# Patient Record
Sex: MALE | Race: WHITE | NOT HISPANIC OR LATINO | Employment: FULL TIME | URBAN - METROPOLITAN AREA
[De-identification: names, ages, dates, MRNs, and addresses within clinical notes are randomized per-mention and may not be internally consistent; named-entity substitution may affect disease eponyms.]

---

## 2020-06-29 ENCOUNTER — OFFICE VISIT (OUTPATIENT)
Dept: INTERNAL MEDICINE | Facility: CLINIC | Age: 35
End: 2020-06-29

## 2020-06-29 VITALS
BODY MASS INDEX: 29.77 KG/M2 | WEIGHT: 201 LBS | DIASTOLIC BLOOD PRESSURE: 82 MMHG | HEART RATE: 64 BPM | OXYGEN SATURATION: 98 % | SYSTOLIC BLOOD PRESSURE: 122 MMHG | HEIGHT: 69 IN

## 2020-06-29 DIAGNOSIS — Z00.00 ANNUAL PHYSICAL EXAM: Primary | ICD-10-CM

## 2020-06-29 LAB
ALBUMIN SERPL-MCNC: 5.2 G/DL (ref 3.5–5.2)
ALBUMIN/GLOB SERPL: 2 G/DL
ALP SERPL-CCNC: 67 U/L (ref 39–117)
ALT SERPL-CCNC: 15 U/L (ref 1–41)
AST SERPL-CCNC: 14 U/L (ref 1–40)
BASOPHILS # BLD AUTO: 0.05 10*3/MM3 (ref 0–0.2)
BASOPHILS NFR BLD AUTO: 0.7 % (ref 0–1.5)
BILIRUB SERPL-MCNC: 0.5 MG/DL (ref 0.2–1.2)
BUN SERPL-MCNC: 15 MG/DL (ref 6–20)
BUN/CREAT SERPL: 15.2 (ref 7–25)
CALCIUM SERPL-MCNC: 9.9 MG/DL (ref 8.6–10.5)
CHLORIDE SERPL-SCNC: 101 MMOL/L (ref 98–107)
CHOLEST SERPL-MCNC: 235 MG/DL (ref 0–200)
CO2 SERPL-SCNC: 28.1 MMOL/L (ref 22–29)
CREAT SERPL-MCNC: 0.99 MG/DL (ref 0.76–1.27)
EOSINOPHIL # BLD AUTO: 0.05 10*3/MM3 (ref 0–0.4)
EOSINOPHIL NFR BLD AUTO: 0.7 % (ref 0.3–6.2)
ERYTHROCYTE [DISTWIDTH] IN BLOOD BY AUTOMATED COUNT: 11.8 % (ref 12.3–15.4)
GLOBULIN SER CALC-MCNC: 2.6 GM/DL
GLUCOSE SERPL-MCNC: 107 MG/DL (ref 65–99)
HCT VFR BLD AUTO: 42.8 % (ref 37.5–51)
HDLC SERPL-MCNC: 62 MG/DL (ref 40–60)
HGB BLD-MCNC: 14.9 G/DL (ref 13–17.7)
IMM GRANULOCYTES # BLD AUTO: 0.02 10*3/MM3 (ref 0–0.05)
IMM GRANULOCYTES NFR BLD AUTO: 0.3 % (ref 0–0.5)
LDLC SERPL CALC-MCNC: 129 MG/DL (ref 0–100)
LDLC/HDLC SERPL: 2.09 {RATIO}
LYMPHOCYTES # BLD AUTO: 1.91 10*3/MM3 (ref 0.7–3.1)
LYMPHOCYTES NFR BLD AUTO: 27.5 % (ref 19.6–45.3)
MCH RBC QN AUTO: 31.9 PG (ref 26.6–33)
MCHC RBC AUTO-ENTMCNC: 34.8 G/DL (ref 31.5–35.7)
MCV RBC AUTO: 91.6 FL (ref 79–97)
MONOCYTES # BLD AUTO: 0.51 10*3/MM3 (ref 0.1–0.9)
MONOCYTES NFR BLD AUTO: 7.3 % (ref 5–12)
NEUTROPHILS # BLD AUTO: 4.4 10*3/MM3 (ref 1.7–7)
NEUTROPHILS NFR BLD AUTO: 63.5 % (ref 42.7–76)
NRBC BLD AUTO-RTO: 0 /100 WBC (ref 0–0.2)
PLATELET # BLD AUTO: 271 10*3/MM3 (ref 140–450)
POTASSIUM SERPL-SCNC: 4.9 MMOL/L (ref 3.5–5.2)
PROT SERPL-MCNC: 7.8 G/DL (ref 6–8.5)
RBC # BLD AUTO: 4.67 10*6/MM3 (ref 4.14–5.8)
SODIUM SERPL-SCNC: 139 MMOL/L (ref 136–145)
TRIGL SERPL-MCNC: 218 MG/DL (ref 0–150)
TSH SERPL DL<=0.005 MIU/L-ACNC: 2.48 UIU/ML (ref 0.27–4.2)
VLDLC SERPL CALC-MCNC: 43.6 MG/DL
WBC # BLD AUTO: 6.94 10*3/MM3 (ref 3.4–10.8)

## 2020-06-29 PROCEDURE — 99395 PREV VISIT EST AGE 18-39: CPT | Performed by: INTERNAL MEDICINE

## 2020-06-29 RX ORDER — LEVOCETIRIZINE DIHYDROCHLORIDE 5 MG/1
TABLET, FILM COATED ORAL
COMMUNITY
Start: 2018-06-22

## 2020-06-29 NOTE — PROGRESS NOTES
"Subjective   Sanjiv Damon is a 34 y.o. male.  Patient presents with a chief complaint of needing a general physical exam.  He has no complaints whatsoever and does extremely well stays active exercises on a regular basis does not smoke.  The patient stays very active at work and at home.      /82   Pulse 64   Ht 176.5 cm (69.49\")   Wt 91.2 kg (201 lb)   SpO2 98%   BMI 29.27 kg/m²     Body mass index is 29.27 kg/m².    History of Present Illness no complaints at this time    The following portions of the patient's history were reviewed and updated as appropriate: allergies, current medications, past family history, past medical history, past social history, past surgical history and problem list.    Review of Systems   Constitutional: Negative.    HENT: Negative.    Gastrointestinal: Negative.    Genitourinary: Negative.    Musculoskeletal: Negative.    Neurological: Negative.    Psychiatric/Behavioral: Negative.        Objective   Physical Exam   Constitutional: He is oriented to person, place, and time. He appears well-developed and well-nourished.   HENT:   Head: Normocephalic and atraumatic.   Eyes: Pupils are equal, round, and reactive to light. Conjunctivae are normal.   Neck: Normal range of motion. Neck supple.   Cardiovascular: Normal rate, regular rhythm and normal heart sounds.   Pulmonary/Chest: Effort normal and breath sounds normal.   Abdominal: Soft. Bowel sounds are normal.   Musculoskeletal: Normal range of motion.   Neurological: He is alert and oriented to person, place, and time.   Skin: Skin is warm and dry.   Psychiatric: He has a normal mood and affect. His behavior is normal.   Nursing note and vitals reviewed.        Assessment/Plan   Sanjiv was seen today for annual exam.    Diagnoses and all orders for this visit:    Annual physical exam  Comments:  I am going to do a full set of labs pertinent to the patient's age and gender  Orders:  -     Comprehensive metabolic panel; " Future  -     Lipid Panel With LDL/HDL Ratio; Future  -     CBC w AUTO Differential; Future  -     TSH; Future  -     TSH  -     CBC w AUTO Differential  -     Lipid Panel With LDL/HDL Ratio  -     Comprehensive metabolic panel                 Answers for HPI/ROS submitted by the patient on 6/22/2020   What is the primary reason for your visit?: Physical

## 2020-08-17 ENCOUNTER — TELEPHONE (OUTPATIENT)
Dept: INTERNAL MEDICINE | Facility: CLINIC | Age: 35
End: 2020-08-17

## 2020-08-17 NOTE — TELEPHONE ENCOUNTER
"PT CALLED IN STATING HE HAD A VIRTUAL VISIT WITH ANOTHER PROVIDER  LAST WEEK DUE TO A 5 DAY MIGRAINE. THE DOCTOR ADVISED THE PT TO UP HIS DOSAGE OF IBUPROFEN BUT TO CALL BACK IF THAT DIDN'T HELP. PATIENT ADVISED THAT HE STILL HAS A MIGRAINE AND THAT THE DOCTOR KELI TOLD HIM TO CALL HIS PCP TO \"GET TESTED\" UNSURE ABOUT THE TEST THE PT IS REFERRING TO. PLEASE ADVISE.   "

## 2020-08-17 NOTE — TELEPHONE ENCOUNTER
Patient is negative for all covid questions. He states the migraine has been occurring since last Thursday only relieved for short period of time by ibuprofen 800mg. Office visit scheduled for patient after discussing with isaiah archer

## 2020-08-17 NOTE — TELEPHONE ENCOUNTER
Patient had a tele-a-doc visit over weekend. Stating that he was told that since he doesn't normally have these headaches to see his PCP for possible meningitis. Please advise

## 2020-08-17 NOTE — TELEPHONE ENCOUNTER
Please discuss with patient-we either need to work him in the office to be seen or is he is acutely ill, he should report to the ER. Thanks.

## 2020-08-18 ENCOUNTER — HOSPITAL ENCOUNTER (OUTPATIENT)
Dept: CT IMAGING | Facility: HOSPITAL | Age: 35
Discharge: HOME | End: 2020-08-18
Admitting: NURSE PRACTITIONER

## 2020-08-18 ENCOUNTER — OFFICE VISIT (OUTPATIENT)
Dept: INTERNAL MEDICINE | Facility: CLINIC | Age: 35
End: 2020-08-18

## 2020-08-18 VITALS
OXYGEN SATURATION: 98 % | WEIGHT: 209 LBS | HEIGHT: 64 IN | BODY MASS INDEX: 35.68 KG/M2 | TEMPERATURE: 98.8 F | SYSTOLIC BLOOD PRESSURE: 126 MMHG | DIASTOLIC BLOOD PRESSURE: 88 MMHG | HEART RATE: 78 BPM

## 2020-08-18 DIAGNOSIS — R51.9 ACUTE INTRACTABLE HEADACHE, UNSPECIFIED HEADACHE TYPE: Primary | ICD-10-CM

## 2020-08-18 DIAGNOSIS — R51.9 ACUTE INTRACTABLE HEADACHE, UNSPECIFIED HEADACHE TYPE: ICD-10-CM

## 2020-08-18 LAB — ERYTHROCYTE [SEDIMENTATION RATE] IN BLOOD: 2 MM/HR (ref 0–20)

## 2020-08-18 PROCEDURE — 70450 CT HEAD/BRAIN W/O DYE: CPT

## 2020-08-18 PROCEDURE — 85651 RBC SED RATE NONAUTOMATED: CPT | Performed by: NURSE PRACTITIONER

## 2020-08-18 PROCEDURE — 36415 COLL VENOUS BLD VENIPUNCTURE: CPT | Performed by: NURSE PRACTITIONER

## 2020-08-18 PROCEDURE — 99214 OFFICE O/P EST MOD 30 MIN: CPT | Performed by: NURSE PRACTITIONER

## 2020-08-18 NOTE — NURSING NOTE
Imani OREILLY  Called RN in xray triage to speak to pt and Imani stated to RN that pt may go home. Results of CT Head were called to Imani OREILLY by Dr. Dominguez.

## 2020-08-18 NOTE — PROGRESS NOTES
Subjective   Sanjiv Damon is a 34 y.o. male who presents due to a right-sided headache since Thursday.    He went for a bike ride on Thursday and did note some tightness at the base of his neck.  However, he complains of a persistent headache on the right side of his head which he describes as throbbing in nature and with pressure since then.  He does note photosensitivity and phonosensitivity.  He started taking ibuprofen 600 mg 3 times daily after a telemedicine visit on Sunday.  He does report mild improvement in the symptoms but continues to experience a headache.  He denies previous history of migraine headaches.  No recent head injury or trauma.  He denies visual changes.  No dizziness.    Headache    Associated symptoms include photophobia. Pertinent negatives include no abdominal pain, coughing, dizziness, ear pain, fever, nausea, neck pain, sinus pressure, sore throat, vomiting or weakness.        The following portions of the patient's history were reviewed and updated as appropriate: allergies, current medications, past social history and problem list.    History reviewed. No pertinent past medical history.      Current Outpatient Medications:   •  levocetirizine (Xyzal Allergy 24HR) 5 MG tablet, , Disp: , Rfl:     No Known Allergies    Review of Systems   Constitutional: Negative for chills, fatigue, fever and unexpected weight change.   HENT: Negative for congestion, ear pain, postnasal drip, sinus pressure, sore throat and trouble swallowing.    Eyes: Positive for photophobia. Negative for visual disturbance.   Respiratory: Negative for cough, chest tightness and wheezing.    Cardiovascular: Negative for chest pain, palpitations and leg swelling.   Gastrointestinal: Negative for abdominal pain, blood in stool, nausea and vomiting.   Genitourinary: Negative for dysuria, frequency and urgency.   Musculoskeletal: Negative for arthralgias, joint swelling, neck pain and neck stiffness.   Skin: Negative  "for color change.   Neurological: Positive for headaches. Negative for dizziness, syncope, weakness and light-headedness.   Hematological: Does not bruise/bleed easily.       Objective   Vitals:    08/18/20 1309   BP: 126/88   BP Location: Left arm   Patient Position: Sitting   Cuff Size: Adult   Pulse: 78   Temp: 98.8 °F (37.1 °C)   TempSrc: Oral   SpO2: 98%   Weight: 94.8 kg (209 lb)   Height: 163.8 cm (64.49\")     Body mass index is 35.33 kg/m².  Physical Exam   Constitutional: He appears well-developed and well-nourished. He is cooperative. He does not have a sickly appearance. He does not appear ill.   HENT:   Head: Normocephalic.   Right Ear: Hearing, tympanic membrane and external ear normal.   Left Ear: Hearing, tympanic membrane and external ear normal.   Nose: Nose normal. No mucosal edema, rhinorrhea, sinus tenderness or nasal deformity. Right sinus exhibits no maxillary sinus tenderness and no frontal sinus tenderness. Left sinus exhibits no maxillary sinus tenderness and no frontal sinus tenderness.   Mouth/Throat: Oropharynx is clear and moist and mucous membranes are normal. Normal dentition.   Eyes: Conjunctivae and lids are normal. Right eye exhibits no discharge and no exudate. Left eye exhibits no discharge and no exudate.   Neck: Trachea normal. Muscular tenderness present. No spinous process tenderness present. No edema and normal range of motion present. No thyroid mass present.   Cardiovascular: Regular rhythm, normal heart sounds and normal pulses.   No murmur heard.  Pulmonary/Chest: Breath sounds normal. No respiratory distress. He has no decreased breath sounds. He has no wheezes. He has no rhonchi. He has no rales.   Lymphadenopathy:        Head (right side): No submental, no submandibular, no tonsillar, no preauricular, no posterior auricular and no occipital adenopathy present.        Head (left side): No submental, no submandibular, no tonsillar, no preauricular, no posterior auricular " and no occipital adenopathy present.   Neurological: He is alert. He has normal strength. No sensory deficit.   Skin: Skin is warm, dry and intact. No cyanosis. Nails show no clubbing.       Assessment/Plan   Sanjiv was seen today for headache.    Diagnoses and all orders for this visit:    Acute intractable headache, unspecified headache type  -     CT Head Without Contrast; Future  -     Sedimentation Rate  -     Comprehensive Metabolic Panel  -     CBC & Differential    Discussed sx with patient, he does report improvement since starting ibuprofen consistently since Sunday.  However, due to no previous history of headaches and severity of pain I will go ahead and order a CT scan to further evaluate.  He will start a Medrol Dosepak tomorrow due to length of symptoms and we will reevaluate on Friday.  I have also ordered labs.

## 2020-08-19 ENCOUNTER — TELEPHONE (OUTPATIENT)
Dept: INTERNAL MEDICINE | Facility: CLINIC | Age: 35
End: 2020-08-19

## 2020-08-19 DIAGNOSIS — R51.9 ACUTE INTRACTABLE HEADACHE, UNSPECIFIED HEADACHE TYPE: Primary | ICD-10-CM

## 2020-08-19 LAB
ALBUMIN SERPL-MCNC: 5.4 G/DL (ref 4–5)
ALBUMIN/GLOB SERPL: 2.2 {RATIO} (ref 1.2–2.2)
ALP SERPL-CCNC: 71 IU/L (ref 39–117)
ALT SERPL-CCNC: 21 IU/L (ref 0–44)
AST SERPL-CCNC: 21 IU/L (ref 0–40)
BASOPHILS # BLD AUTO: 0.1 X10E3/UL (ref 0–0.2)
BASOPHILS NFR BLD AUTO: 1 %
BILIRUB SERPL-MCNC: 0.3 MG/DL (ref 0–1.2)
BUN SERPL-MCNC: 17 MG/DL (ref 6–20)
BUN/CREAT SERPL: 19 (ref 9–20)
CALCIUM SERPL-MCNC: 10.7 MG/DL (ref 8.7–10.2)
CHLORIDE SERPL-SCNC: 100 MMOL/L (ref 96–106)
CO2 SERPL-SCNC: 27 MMOL/L (ref 20–29)
CREAT SERPL-MCNC: 0.91 MG/DL (ref 0.76–1.27)
EOSINOPHIL # BLD AUTO: 0.1 X10E3/UL (ref 0–0.4)
EOSINOPHIL NFR BLD AUTO: 1 %
ERYTHROCYTE [DISTWIDTH] IN BLOOD BY AUTOMATED COUNT: 11.8 % (ref 11.6–15.4)
GLOBULIN SER CALC-MCNC: 2.5 G/DL (ref 1.5–4.5)
GLUCOSE SERPL-MCNC: 94 MG/DL (ref 65–99)
HCT VFR BLD AUTO: 46.6 % (ref 37.5–51)
HGB BLD-MCNC: 16 G/DL (ref 13–17.7)
IMM GRANULOCYTES # BLD AUTO: 0 X10E3/UL (ref 0–0.1)
IMM GRANULOCYTES NFR BLD AUTO: 0 %
LYMPHOCYTES # BLD AUTO: 2.2 X10E3/UL (ref 0.7–3.1)
LYMPHOCYTES NFR BLD AUTO: 34 %
MCH RBC QN AUTO: 31.2 PG (ref 26.6–33)
MCHC RBC AUTO-ENTMCNC: 34.3 G/DL (ref 31.5–35.7)
MCV RBC AUTO: 91 FL (ref 79–97)
MONOCYTES # BLD AUTO: 0.5 X10E3/UL (ref 0.1–0.9)
MONOCYTES NFR BLD AUTO: 8 %
NEUTROPHILS # BLD AUTO: 3.6 X10E3/UL (ref 1.4–7)
NEUTROPHILS NFR BLD AUTO: 56 %
PLATELET # BLD AUTO: 272 X10E3/UL (ref 150–450)
POTASSIUM SERPL-SCNC: 5.1 MMOL/L (ref 3.5–5.2)
PROT SERPL-MCNC: 7.9 G/DL (ref 6–8.5)
RBC # BLD AUTO: 5.13 X10E6/UL (ref 4.14–5.8)
SODIUM SERPL-SCNC: 145 MMOL/L (ref 134–144)
WBC # BLD AUTO: 6.4 X10E3/UL (ref 3.4–10.8)

## 2020-08-19 RX ORDER — METHYLPREDNISOLONE 4 MG/1
TABLET ORAL
Qty: 21 TABLET | Refills: 0 | Status: SHIPPED | OUTPATIENT
Start: 2020-08-19 | End: 2020-08-25

## 2020-08-19 NOTE — TELEPHONE ENCOUNTER
PT CALLED STATING HE SAW ERIC PARKINSON MITZI AND SHE WAS SUPPOSE TO CALL IN A MEDICATION BUT THE PHARMACY DOES NOT HAVE IT.     PLEASE ADVISE     PREFERRED PHARMACY   [id number] - [place] [address] AT [address] - [phone]  - [phone] FX  [phone]    PT CALL BACK   [phone]

## 2020-08-21 ENCOUNTER — OFFICE VISIT (OUTPATIENT)
Dept: INTERNAL MEDICINE | Facility: CLINIC | Age: 35
End: 2020-08-21

## 2020-08-21 VITALS
TEMPERATURE: 97.8 F | OXYGEN SATURATION: 98 % | HEART RATE: 62 BPM | DIASTOLIC BLOOD PRESSURE: 84 MMHG | WEIGHT: 205 LBS | SYSTOLIC BLOOD PRESSURE: 130 MMHG | BODY MASS INDEX: 35 KG/M2 | HEIGHT: 64 IN

## 2020-08-21 DIAGNOSIS — R51.9 ACUTE INTRACTABLE HEADACHE, UNSPECIFIED HEADACHE TYPE: Primary | ICD-10-CM

## 2020-08-21 PROCEDURE — 99213 OFFICE O/P EST LOW 20 MIN: CPT | Performed by: NURSE PRACTITIONER

## 2020-08-22 NOTE — PROGRESS NOTES
Subjective   Sanjiv Damon is a 34 y.o. male who presents for a fu regarding a persistent headache.    He presents for follow-up regarding a persistent headache.  His CT scan of the head and labs earlier this week did not show any acute abnormalities.  He states the throbbing sensation in his head has resolved.  He now has mild pressure on the left occipital area but the discomfort on the right side of his head has resolved.  He denies visual changes.  No dizziness.  Denies neck pain or stiffness.    Headache    Pertinent negatives include no abdominal pain, coughing, ear pain, fever, nausea, neck pain, sinus pressure, sore throat, vomiting or weakness.        The following portions of the patient's history were reviewed and updated as appropriate: allergies, current medications, past social history and problem list.    History reviewed. No pertinent past medical history.      Current Outpatient Medications:   •  levocetirizine (Xyzal Allergy 24HR) 5 MG tablet, , Disp: , Rfl:   •  methylPREDNISolone (Medrol) 4 MG dose pack, follow package directions, Disp: 21 tablet, Rfl: 0    No Known Allergies    Review of Systems   Constitutional: Negative for chills, fatigue, fever and unexpected weight change.   HENT: Negative for congestion, ear pain, postnasal drip, sinus pressure, sore throat and trouble swallowing.    Eyes: Negative for visual disturbance.   Respiratory: Negative for cough, chest tightness and wheezing.    Cardiovascular: Negative for chest pain, palpitations and leg swelling.   Gastrointestinal: Negative for abdominal pain, blood in stool, nausea and vomiting.   Genitourinary: Negative for dysuria, frequency and urgency.   Musculoskeletal: Negative for arthralgias, joint swelling, neck pain and neck stiffness.   Skin: Negative for color change.   Neurological: Positive for headaches. Negative for syncope and weakness.   Hematological: Does not bruise/bleed easily.       Objective   Vitals:    08/21/20  "0902   BP: 130/84   BP Location: Left arm   Patient Position: Sitting   Cuff Size: Adult   Pulse: 62   Temp: 97.8 °F (36.6 °C)   TempSrc: Oral   SpO2: 98%   Weight: 93 kg (205 lb)   Height: 163.8 cm (64.49\")     Body mass index is 34.66 kg/m².  Physical Exam   Constitutional: He appears well-developed and well-nourished. He is cooperative. He does not have a sickly appearance. He does not appear ill.   HENT:   Head: Normocephalic.   Right Ear: Hearing, tympanic membrane and external ear normal.   Left Ear: Hearing, tympanic membrane and external ear normal.   Nose: Nose normal. No mucosal edema, rhinorrhea, sinus tenderness or nasal deformity. Right sinus exhibits no maxillary sinus tenderness and no frontal sinus tenderness. Left sinus exhibits no maxillary sinus tenderness and no frontal sinus tenderness.   Mouth/Throat: Oropharynx is clear and moist and mucous membranes are normal. Normal dentition.   Eyes: Conjunctivae and lids are normal. Right eye exhibits no discharge and no exudate. Left eye exhibits no discharge and no exudate.   Neck: Trachea normal. No muscular tenderness present. Carotid bruit is not present. No edema and normal range of motion present. No thyroid mass present.   Cardiovascular: Regular rhythm, normal heart sounds and normal pulses.   No murmur heard.  Pulmonary/Chest: Breath sounds normal. No respiratory distress. He has no decreased breath sounds. He has no wheezes. He has no rhonchi. He has no rales.   Lymphadenopathy:        Head (right side): No submental, no submandibular, no tonsillar, no preauricular, no posterior auricular and no occipital adenopathy present.        Head (left side): No submental, no submandibular, no tonsillar, no preauricular, no posterior auricular and no occipital adenopathy present.   Neurological: He is alert. He has normal strength. No sensory deficit.   Skin: Skin is warm, dry and intact. No cyanosis. Nails show no clubbing.       Assessment/Plan "   Sanjiv was seen today for headache.    Diagnoses and all orders for this visit:    Acute intractable headache, unspecified headache type    Reviewed recent labs and CT scan with patient which did not show any acute abnormalities.  He is responding well to Medrol Dosepak and will complete therapy.  He will email the office next week with an update of the symptoms.  Symptoms due to new onset of migraine headaches?  He may need Imitrex if future headaches occur, continue to monitor.

## 2020-08-26 ENCOUNTER — OFFICE VISIT (OUTPATIENT)
Dept: INTERNAL MEDICINE | Facility: CLINIC | Age: 35
End: 2020-08-26

## 2020-08-26 DIAGNOSIS — R51.9 ACUTE INTRACTABLE HEADACHE, UNSPECIFIED HEADACHE TYPE: Primary | ICD-10-CM

## 2020-08-26 PROCEDURE — 99442 PR PHYS/QHP TELEPHONE EVALUATION 11-20 MIN: CPT | Performed by: NURSE PRACTITIONER

## 2020-08-26 RX ORDER — SUMATRIPTAN 100 MG/1
TABLET, FILM COATED ORAL
Qty: 12 TABLET | Refills: 0 | Status: SHIPPED | OUTPATIENT
Start: 2020-08-26

## 2020-08-26 NOTE — PROGRESS NOTES
Subjective   Sanjiv Damon is a 34 y.o. male who presents for f/u regarding headache.    You have chosen to receive care through a telephone visit. Do you consent to use a telephone visit for your medical care today? Yes    He has completed a Medrol dose pack for a persistent headache.  He states the headache was improving but has gradually worsened since stopping the medication.  The pain is more in the right occipital area and he denies recent head injury or trauma.  No vision changes or dizziness.  He reports a pressure behind his right eye.  He has had a couple instances of nausea but denies vomiting or diarrhea.  He has been taking ibuprofen 400 mg with little improvement.  He did have a CT scan of the head last weeks which did not show any acute abnormalities.  Labs were also normal.       The following portions of the patient's history were reviewed and updated as appropriate: allergies, current medications, past social history and problem list.    History reviewed. No pertinent past medical history.      Current Outpatient Medications:   •  levocetirizine (Xyzal Allergy 24HR) 5 MG tablet, , Disp: , Rfl:   •  SUMAtriptan (IMITREX) 100 MG tablet, Take one tablet at onset of headache. May repeat dose one time in 2 hours if headache not relieved., Disp: 12 tablet, Rfl: 0    No Known Allergies    Review of Systems   Constitutional: Negative for chills, fatigue, fever and unexpected weight change.   HENT: Negative for congestion, ear pain, postnasal drip, sinus pressure, sore throat and trouble swallowing.    Eyes: Negative for visual disturbance.   Respiratory: Negative for cough, chest tightness and wheezing.    Cardiovascular: Negative for chest pain, palpitations and leg swelling.   Gastrointestinal: Negative for abdominal pain, blood in stool, nausea and vomiting.   Genitourinary: Negative for dysuria, frequency and urgency.   Musculoskeletal: Negative for arthralgias and joint swelling.   Skin: Negative  for color change.   Neurological: Positive for headaches. Negative for syncope and weakness.   Hematological: Does not bruise/bleed easily.       Objective   There were no vitals filed for this visit.  There is no height or weight on file to calculate BMI.  Physical Exam   Psychiatric: He has a normal mood and affect. His behavior is normal. Judgment and thought content normal.       Assessment/Plan   Sanjiv was seen today for headache.    Diagnoses and all orders for this visit:    Acute intractable headache, unspecified headache type  -     SUMAtriptan (IMITREX) 100 MG tablet; Take one tablet at onset of headache. May repeat dose one time in 2 hours if headache not relieved.  -     MRI Brain With & Without Contrast; Future    He continues to complain of a persistent headache without a previous history of headaches so.  His CT scan and labs did not show any abnormalities.  Due to persistence of symptoms I would like to obtain an MRI to further evaluate.  Symptoms due to new onset of migraine headache?  He has given Imitrex which he may begin for headache but will follow results of MRI as well.    History and medical judgement obtained from phone conversation with patient. No physical examination was performed. Approximately 11 minutes spent in phone conversation with patient.

## 2020-08-31 ENCOUNTER — TELEPHONE (OUTPATIENT)
Dept: INTERNAL MEDICINE | Facility: CLINIC | Age: 35
End: 2020-08-31

## 2020-08-31 NOTE — TELEPHONE ENCOUNTER
appt made for 9/3/2020 at 10:20AM-  No auth needed.  Order faxed to [facility] and pt notified of appt date and time

## 2020-08-31 NOTE — TELEPHONE ENCOUNTER
PATIENT CALLED AND WANTED TO KNOW THE STATUS OF MRI OF BRAIN .   PLEASE CALL WHEN APPROVED AND ABLE TO SET UP APPOINTMENT. STILL CONTINUES TO HAVE HEADACHES.    PLEASE CALL [phone]    HE WOULD LIKE TO GO TO [facility]  OR [facility] [address].

## 2020-09-09 ENCOUNTER — TELEPHONE (OUTPATIENT)
Dept: INTERNAL MEDICINE | Facility: CLINIC | Age: 35
End: 2020-09-09

## 2020-09-09 NOTE — TELEPHONE ENCOUNTER
Moonlighter at bedside, aware of new BP.    pro    Caller: DELMER LARSEN    Relationship:     Best call back number: [phone]         What test was performed: MRI    When was the test performed: 09/03/2020    Where was the test performed: [facility]    Additional notes: PT CALLED REQUESTING WHEN HE MAY RECEIVE HIS IMAGING RESULTS

## 2020-09-09 NOTE — TELEPHONE ENCOUNTER
Discussed results of MRI with patient-his headache is better, did take Ibuprofen over the weekend but states sx improved.

## 2021-01-11 ENCOUNTER — OFFICE VISIT (OUTPATIENT)
Dept: FAMILY MEDICINE CLINIC | Facility: CLINIC | Age: 36
End: 2021-01-11

## 2021-01-11 VITALS
WEIGHT: 214 LBS | OXYGEN SATURATION: 99 % | BODY MASS INDEX: 31.7 KG/M2 | DIASTOLIC BLOOD PRESSURE: 76 MMHG | HEIGHT: 69 IN | SYSTOLIC BLOOD PRESSURE: 120 MMHG | RESPIRATION RATE: 16 BRPM | TEMPERATURE: 96.9 F | HEART RATE: 93 BPM

## 2021-01-11 DIAGNOSIS — R19.7 DIARRHEA, UNSPECIFIED TYPE: Primary | ICD-10-CM

## 2021-01-11 PROCEDURE — 99213 OFFICE O/P EST LOW 20 MIN: CPT | Performed by: FAMILY MEDICINE

## 2021-01-11 NOTE — PROGRESS NOTES
"Chief Complaint  Annual Exam    Subjective    History of Present Illness {CC  Problem List  Visit  Diagnosis   Encounters  Notes  Medications  Labs  Result Review Imaging  Media :23}     Sanjiv Damon presents to [facility] for Annual Exam.  History of Present Illness   Here to establish care. Was previously seen by Dr. Wagner once, now needing a new PCP.     Overall healthy but has noticed some bowel issues for the past few years. Feels like he needs to use the bathroom more than is \"usual\". Initially started taking some Lactaid with dairy, which seems to help, but symptoms remain. On a typical day, he'll have his first bowel movement shortly after drinking a cup of coffee, then another midmorning, then another after lunch. Will occasionally have another in the afternoon/evening but that seems more diet dependent. Has a fair amount of gas associated, can get uncomfortable if he can't pass it readily. No pain with defecation though occasionally has some bleeding if he's going more than usual. Doesn't use baby wipes regularly.     Objective     Vital Signs:   /76   Pulse 93   Temp 96.9 °F (36.1 °C)   Resp 16   Ht 175.3 cm (69\")   Wt 97.1 kg (214 lb)   SpO2 99%   BMI 31.60 kg/m²   Physical Exam  Vitals signs and nursing note reviewed.   Constitutional:       General: He is not in acute distress.     Appearance: Normal appearance. He is well-developed. He is not ill-appearing.   HENT:      Right Ear: Tympanic membrane, ear canal and external ear normal.      Left Ear: Tympanic membrane, ear canal and external ear normal.      Nose: Nose normal.      Mouth/Throat:      Mouth: Mucous membranes are moist.      Pharynx: Oropharynx is clear.   Eyes:      Extraocular Movements: Extraocular movements intact.      Conjunctiva/sclera: Conjunctivae normal.      Pupils: Pupils are equal, round, and reactive to light.   Neck:      Musculoskeletal: Normal range of " motion and neck supple.   Cardiovascular:      Rate and Rhythm: Normal rate and regular rhythm.      Pulses: Normal pulses.      Heart sounds: Normal heart sounds. No murmur.   Pulmonary:      Effort: Pulmonary effort is normal. No respiratory distress.      Breath sounds: Normal breath sounds. No wheezing.   Abdominal:      General: Bowel sounds are normal. There is no distension.      Palpations: Abdomen is soft.      Tenderness: There is no abdominal tenderness. There is no guarding or rebound.   Musculoskeletal: Normal range of motion.         General: No tenderness.   Skin:     General: Skin is warm and dry.   Neurological:      General: No focal deficit present.      Mental Status: He is alert and oriented to person, place, and time.      Sensory: No sensory deficit.   Psychiatric:         Mood and Affect: Mood normal.         Behavior: Behavior normal.          Result Review  Data Reviewed:{ Labs  Result Review  Imaging  Med Tab  Media :23}                   Assessment and Plan {CC Problem List  Visit Diagnosis  ROS  Review (Popup)  Health Maintenance  Quality  BestPractice  Medications  SmartSets  SnapShot Encounters  Media :23}   Diagnoses and all orders for this visit:    1. Diarrhea, unspecified type (Primary)    Etiology of diarrhea not certain though certainly seems somewhat functional. May have some component of irritable bowel, and certainly some ongoing lactose intolerance. Recommended trying a daily dose of Metamucil for general bowel regulation. Also suggested he try some probiotic foods. He will do all of this and let me know if he makes any progress.    Follow-up as below. Encouraged communication via [employer] in the meantime.      Follow Up {Instructions Charge Capture  Follow-up Communications :23}     Patient was given instructions and counseling regarding his condition or for health maintenance advice. Please see specific information pulled into the AVS (placed there by  myself) if appropriate.    Return in about 3 months (around 4/11/2021), or if symptoms worsen or fail to improve.      SEAN Lee MD

## 2021-04-16 ENCOUNTER — BULK ORDERING (OUTPATIENT)
Dept: CASE MANAGEMENT | Facility: OTHER | Age: 36
End: 2021-04-16

## 2021-04-16 DIAGNOSIS — Z23 IMMUNIZATION DUE: ICD-10-CM

## 2021-09-28 ENCOUNTER — OFFICE VISIT (OUTPATIENT)
Dept: FAMILY MEDICINE CLINIC | Facility: CLINIC | Age: 36
End: 2021-09-28

## 2021-09-28 VITALS
DIASTOLIC BLOOD PRESSURE: 68 MMHG | WEIGHT: 205 LBS | HEART RATE: 81 BPM | BODY MASS INDEX: 30.27 KG/M2 | RESPIRATION RATE: 16 BRPM | OXYGEN SATURATION: 99 % | SYSTOLIC BLOOD PRESSURE: 100 MMHG

## 2021-09-28 DIAGNOSIS — Z23 NEED FOR VACCINATION: ICD-10-CM

## 2021-09-28 DIAGNOSIS — Z00.00 ROUTINE HEALTH MAINTENANCE: Primary | ICD-10-CM

## 2021-09-28 PROCEDURE — 90686 IIV4 VACC NO PRSV 0.5 ML IM: CPT | Performed by: FAMILY MEDICINE

## 2021-09-28 PROCEDURE — 90471 IMMUNIZATION ADMIN: CPT | Performed by: FAMILY MEDICINE

## 2021-09-28 PROCEDURE — 99395 PREV VISIT EST AGE 18-39: CPT | Performed by: FAMILY MEDICINE

## 2021-09-28 NOTE — PROGRESS NOTES
Chief Complaint  Annual Exam    Subjective    History of Present Illness {CC  Problem List  Visit  Diagnosis   Encounters  Notes  Medications  Labs  Result Review Imaging  Media :23}     Sanjiv Damon presents to [facility] for Annual Exam.  History of Present Illness     Here today for annual exam and general preventive maintenance. Doing quite well overall. Baby daughter was born a couple weeks ago, is currently on paternity leave and enjoying every minute.    Trying to take good care of himself now that he is a new father. Working on his diet, tries to exercise regularly. Takes his levo cetirizine as needed for allergies, has not needed any further sumatriptan.    Is up-to-date on most preventive recommendations. Would like to get a flu shot while he is here today.    Has good family and social support. Enjoys his work. Goes to the dentist regularly.    Objective     Vital Signs:   /68   Pulse 81   Resp 16   Wt 93 kg (205 lb)   SpO2 99%   BMI 30.27 kg/m²   Physical Exam  Vitals and nursing note reviewed.   Constitutional:       General: He is not in acute distress.     Appearance: Normal appearance. He is not ill-appearing.   Cardiovascular:      Rate and Rhythm: Normal rate and regular rhythm.      Pulses: Normal pulses.      Heart sounds: Normal heart sounds. No murmur heard.     Pulmonary:      Effort: Pulmonary effort is normal. No respiratory distress.      Breath sounds: Normal breath sounds. No rales.   Neurological:      Mental Status: He is alert and oriented to person, place, and time. Mental status is at baseline.   Psychiatric:         Mood and Affect: Mood normal.         Behavior: Behavior normal.          Result Review  Data Reviewed:{ Labs  Result Review  Imaging  Med Tab  Media :23}                   Assessment and Plan {CC Problem List  Visit Diagnosis  ROS  Review (Popup)  Health Maintenance  Quality  BestPractice  Medications   SmartSets  SnapShot Encounters  Media :23}   Diagnoses and all orders for this visit:    1. Routine health maintenance (Primary)    2. Need for vaccination  -     FluLaval/Fluarix >6 Months ([id number])    Flu shot is requested. No other interventions recommended at this time.    Recommended follow-up as below. Encouraged communication via [employer] in the meantime.      Follow Up {Instructions Charge Capture  Follow-up Communications :23}     Patient was given instructions and counseling regarding his condition or for health maintenance advice. Please see specific information pulled into the AVS (placed there by myself) if appropriate.    Return in about 1 year (around 9/28/2022) for Preventive Health Maintenance.      SEAN Lee MD    Prevention counseling performed as below: Mindfulness for stress management.

## 2022-12-21 ENCOUNTER — TELEPHONE (OUTPATIENT)
Dept: FAMILY MEDICINE CLINIC | Facility: CLINIC | Age: 37
End: 2022-12-21

## 2022-12-21 ENCOUNTER — TELEMEDICINE (OUTPATIENT)
Dept: FAMILY MEDICINE CLINIC | Facility: TELEHEALTH | Age: 37
End: 2022-12-21

## 2022-12-21 VITALS — HEIGHT: 69 IN | WEIGHT: 200 LBS | BODY MASS INDEX: 29.62 KG/M2

## 2022-12-21 DIAGNOSIS — U07.1 COVID-19: Primary | ICD-10-CM

## 2022-12-21 PROCEDURE — 99213 OFFICE O/P EST LOW 20 MIN: CPT | Performed by: NURSE PRACTITIONER

## 2022-12-21 RX ORDER — BROMPHENIRAMINE MALEATE, PSEUDOEPHEDRINE HYDROCHLORIDE, AND DEXTROMETHORPHAN HYDROBROMIDE 2; 30; 10 MG/5ML; MG/5ML; MG/5ML
10 SYRUP ORAL 4 TIMES DAILY PRN
Qty: 280 ML | Refills: 0 | Status: SHIPPED | OUTPATIENT
Start: 2022-12-21

## 2022-12-21 RX ORDER — BENZONATATE 200 MG/1
200 CAPSULE ORAL 3 TIMES DAILY PRN
Qty: 15 CAPSULE | Refills: 0 | Status: SHIPPED | OUTPATIENT
Start: 2022-12-21

## 2022-12-21 RX ORDER — ALBUTEROL SULFATE 90 UG/1
2 AEROSOL, METERED RESPIRATORY (INHALATION) EVERY 4 HOURS PRN
Qty: 6.7 G | Refills: 0 | Status: SHIPPED | OUTPATIENT
Start: 2022-12-21

## 2022-12-21 NOTE — TELEPHONE ENCOUNTER
HUB TO READ:    Left VM. Pt does not qualify for Paxlovid d/t age and no underlying comorbidities. Dr. Lee recommend OTC symptom mgmt- can alternate between tylenol/ibuprofen, delysm or mucinex for cough/congestion, lots of fluid and rest.

## 2022-12-21 NOTE — PATIENT INSTRUCTIONS
Treat symptoms as you will with any cold or viral illness.  May use over-the-counter medicine as needed and rotate with prescribed cough syrup.  Tylenol/ibuprofen for pain and fever, increase fluids and rest.    Warning signs for COVID-19: trouble breathing, increasing shortness of breath, persistent chest pain or pressure, new confusion, inability to stay awake, pale, gray or blue-colored skin, lips or nail beds. If you show any of these signs seek Emergency Care.     If symptoms worsen or do not improve follow up with your PCP or visit your nearest Urgent Care Center or ER.      COVID QUARANTINE GUIDELINES:    You should Quarantine while you are waiting for your results.     Positive COVID result:  Isolate for 10 days from the date your symptoms began.  If symptoms fully resolve, you may end Isolation after 5 days from the onset of symptoms and wear a well-fitted mask for the additional 5 days.   If you can not wear a well-fitted mask AT ALL TIMES, you must remain in isolation for a full 10 days from the onset of symptoms.     If you have a Positive COVID result and NEVER had symptoms:  Isolate for 5 days from the date you had a positive test.   Wear a well-fitted mask for an additional 5 days.   If you can not wear a well fitted mask AT ALL TIMES, you must remain in isolation for the full 10 days from the onset of symptoms.       If you are NOT fully Vaccinated or had a Booster shot if eligible, but have had COVID EXPOSURE:  Quarantine for 10 days from the last day of exposure  Quarantine may be shortened if you have no symptoms and test Negative on day 5 post exposure.   Wear a well-fitted mask for 10 days from the last day of exposure.  If symptoms develop, stay home and get re-tested.     If HAVE been fully Vaccinated and had a Booster shot if eligible, but have had COVID EXPOSURE and have No Symptoms:  You do NOT need to quarantine  Wear a well-fitted mask for 10 days from the last day of exposure.  Get a  COVID test on day 5.  If symptoms develop, stay home and get re-tested.

## 2022-12-21 NOTE — TELEPHONE ENCOUNTER
Caller: Sanjiv Damon    Relationship to patient: Self    Best call back number: [phone]     Date of exposure: 12/17/22    Date of positive COVID19 test: 12/20/22    COVID19 symptoms: COUGH / CONGESTION / BODY ACHES / FATIGUE    Date of initial quarantine: 12/20/22    Additional information or concerns: PATIENT WOULD LIKE TO KNOW IF HE SHOULD HAVE PAXLOVID, OR WHAT OTHER TREATMENT OPTIONS HE HAS AVAILABLE.    What is the patients preferred pharmacy: [id number] [address] AT [facility] [address] - [phone] [facility] [phone]   [phone]

## 2022-12-21 NOTE — PROGRESS NOTES
Subjective   Chief Complaint   Patient presents with   • URI     COVID 19       Sanjiv Damon is a 37 y.o. male.     History of Present Illness  Patient reports cough, congestion, runny nose, body aches and fatigue for about 3 days.  He also feels like he cannot take a deep breath or feels winded.  Symptoms are worsening.  He tested positive for COVID yesterday.  He has been vaccinated plus boosted a couple of months ago.  No prior history of COVID.  He is interested in Paxlovid treatment.  URI   This is a new problem. Episode onset: 3 days. The problem has been gradually worsening. Associated symptoms include congestion, coughing, rhinorrhea and a sore throat. Pertinent negatives include no chest pain, nausea, vomiting or wheezing. He has tried nothing for the symptoms.        No Known Allergies    Past Medical History:   Diagnosis Date   • Hypertension        Past Surgical History:   Procedure Laterality Date   • FINGER FRACTURE SURGERY Right 2012   • FRACTURE SURGERY  2012    Pin inserted in right pinky finger   • HAND SURGERY      broken bone pincky RT hand       Social History     Socioeconomic History   • Marital status:    Tobacco Use   • Smoking status: Never   • Smokeless tobacco: Never   Substance and Sexual Activity   • Alcohol use: Yes     Alcohol/week: 15.0 standard drinks     Types: 10 Cans of beer, 5 Shots of liquor per week     Comment: 10-15 weekly   • Drug use: Never   • Sexual activity: Yes     Partners: Female     Birth control/protection: None     Comment: , one child       Family History   Problem Relation Age of Onset   • No Known Problems Mother    • Hyperlipidemia Father    • No Known Problems Brother    • No Known Problems Other    • Prostate cancer Neg Hx    • Colon cancer Neg Hx          Current Outpatient Medications:   •  albuterol sulfate  (90 Base) MCG/ACT inhaler, Inhale 2 puffs Every 4 (Four) Hours As Needed for Wheezing or Shortness of Air., Disp: 6.7 g,  "Rfl: 0  •  benzonatate (TESSALON) 200 MG capsule, Take 1 capsule by mouth 3 (Three) Times a Day As Needed for Cough., Disp: 15 capsule, Rfl: 0  •  brompheniramine-pseudoephedrine-DM 30-2-10 MG/5ML syrup, Take 10 mL by mouth 4 (Four) Times a Day As Needed for Congestion or Cough., Disp: 280 mL, Rfl: 0  •  levocetirizine (Xyzal Allergy 24HR) 5 MG tablet, , Disp: , Rfl:   •  SUMAtriptan (IMITREX) 100 MG tablet, Take one tablet at onset of headache. May repeat dose one time in 2 hours if headache not relieved., Disp: 12 tablet, Rfl: 0      Review of Systems   HENT: Positive for congestion, rhinorrhea and sore throat.    Respiratory: Positive for cough. Negative for wheezing.    Cardiovascular: Negative for chest pain.   Gastrointestinal: Negative for nausea and vomiting.        Vitals:    12/21/22 1302   Weight: 90.7 kg (200 lb)   Height: 175.3 cm (69\")       Objective   Physical Exam     Procedures     Assessment & Plan   Diagnoses and all orders for this visit:    1. COVID-19 (Primary)  -     brompheniramine-pseudoephedrine-DM 30-2-10 MG/5ML syrup; Take 10 mL by mouth 4 (Four) Times a Day As Needed for Congestion or Cough.  Dispense: 280 mL; Refill: 0  -     albuterol sulfate  (90 Base) MCG/ACT inhaler; Inhale 2 puffs Every 4 (Four) Hours As Needed for Wheezing or Shortness of Air.  Dispense: 6.7 g; Refill: 0  -     benzonatate (TESSALON) 200 MG capsule; Take 1 capsule by mouth 3 (Three) Times a Day As Needed for Cough.  Dispense: 15 capsule; Refill: 0    Treat symptoms as you will with any cold or viral illness.  May use over-the-counter medicine as needed and rotate with prescribed cough syrup.  Tylenol/ibuprofen for pain and fever, increase fluids and rest.    Warning signs for COVID-19: trouble breathing, increasing shortness of breath, persistent chest pain or pressure, new confusion, inability to stay awake, pale, gray or blue-colored skin, lips or nail beds. If you show any of these signs seek Emergency " Care.     If symptoms worsen or do not improve follow up with your PCP or visit your nearest Urgent Care Center or ER.      COVID QUARANTINE GUIDELINES:    You should Quarantine while you are waiting for your results.     Positive COVID result:  Isolate for 10 days from the date your symptoms began.  If symptoms fully resolve, you may end Isolation after 5 days from the onset of symptoms and wear a well-fitted mask for the additional 5 days.   If you can not wear a well-fitted mask AT ALL TIMES, you must remain in isolation for a full 10 days from the onset of symptoms.     If you have a Positive COVID result and NEVER had symptoms:  Isolate for 5 days from the date you had a positive test.   Wear a well-fitted mask for an additional 5 days.   If you can not wear a well fitted mask AT ALL TIMES, you must remain in isolation for the full 10 days from the onset of symptoms.       If you are NOT fully Vaccinated or had a Booster shot if eligible, but have had COVID EXPOSURE:  Quarantine for 10 days from the last day of exposure  Quarantine may be shortened if you have no symptoms and test Negative on day 5 post exposure.   Wear a well-fitted mask for 10 days from the last day of exposure.  If symptoms develop, stay home and get re-tested.     If HAVE been fully Vaccinated and had a Booster shot if eligible, but have had COVID EXPOSURE and have No Symptoms:  You do NOT need to quarantine  Wear a well-fitted mask for 10 days from the last day of exposure.  Get a COVID test on day 5.  If symptoms develop, stay home and get re-tested.             PLAN: Patient does not meet high risk criteria to qualify for Paxlovid treatment.  Advised patient to continue treating symptoms as he will with any cold or viral illness.  Patient is agreeable to plan.  Discussed dosing, side effects, recommended other symptomatic care.  Patient should follow up with primary care provider, Urgent Care or ER if symptoms worsen, fail to resolve or  other symptoms need attention. Patient/family agree to the above.         DENILSON Menchaca     The use of a video visit has been reviewed with the patient and verbal informed consent has been obtained. Myself and Sanjiv Damon participated in this visit. The patient is located at [address] [place] [address]. I am located in [place], [place]. Mychart and Zoom were utilized.        This visit was performed via Telehealth.  This patient has been instructed to follow-up with their primary care provider if their symptoms worsen or the treatment provided does not resolve their illness.

## 2024-08-24 DIAGNOSIS — R06.83 LOUD SNORING: Primary | ICD-10-CM

## 2024-09-23 ENCOUNTER — OFFICE VISIT (OUTPATIENT)
Dept: SLEEP MEDICINE | Facility: HOSPITAL | Age: 39
End: 2024-09-23
Payer: COMMERCIAL

## 2024-09-23 VITALS
SYSTOLIC BLOOD PRESSURE: 127 MMHG | BODY MASS INDEX: 30.64 KG/M2 | WEIGHT: 214 LBS | HEART RATE: 77 BPM | OXYGEN SATURATION: 97 % | HEIGHT: 70 IN | DIASTOLIC BLOOD PRESSURE: 85 MMHG

## 2024-09-23 DIAGNOSIS — E66.9 OBESITY (BMI 30-39.9): ICD-10-CM

## 2024-09-23 DIAGNOSIS — G47.10 HYPERSOMNIA: ICD-10-CM

## 2024-09-23 DIAGNOSIS — R06.83 LOUD SNORING: Primary | ICD-10-CM

## 2024-09-23 PROCEDURE — G0463 HOSPITAL OUTPT CLINIC VISIT: HCPCS

## 2024-09-30 ENCOUNTER — OFFICE VISIT (OUTPATIENT)
Dept: FAMILY MEDICINE CLINIC | Facility: CLINIC | Age: 39
End: 2024-09-30
Payer: COMMERCIAL

## 2024-09-30 VITALS
WEIGHT: 213 LBS | RESPIRATION RATE: 16 BRPM | OXYGEN SATURATION: 100 % | HEART RATE: 56 BPM | SYSTOLIC BLOOD PRESSURE: 120 MMHG | BODY MASS INDEX: 30.49 KG/M2 | HEIGHT: 70 IN | DIASTOLIC BLOOD PRESSURE: 82 MMHG

## 2024-09-30 DIAGNOSIS — Z00.00 ROUTINE HEALTH MAINTENANCE: Primary | ICD-10-CM

## 2024-09-30 DIAGNOSIS — Z13.6 ENCOUNTER FOR LIPID SCREENING FOR CARDIOVASCULAR DISEASE: ICD-10-CM

## 2024-09-30 DIAGNOSIS — Z11.59 ENCOUNTER FOR HEPATITIS C SCREENING TEST FOR LOW RISK PATIENT: ICD-10-CM

## 2024-09-30 DIAGNOSIS — Z13.1 SCREENING FOR DIABETES MELLITUS: ICD-10-CM

## 2024-09-30 DIAGNOSIS — Z13.220 ENCOUNTER FOR LIPID SCREENING FOR CARDIOVASCULAR DISEASE: ICD-10-CM

## 2024-09-30 PROBLEM — E66.811 CLASS 1 OBESITY DUE TO EXCESS CALORIES WITHOUT SERIOUS COMORBIDITY WITH BODY MASS INDEX (BMI) OF 30.0 TO 30.9 IN ADULT: Status: ACTIVE | Noted: 2024-09-30

## 2024-09-30 PROBLEM — E66.09 CLASS 1 OBESITY DUE TO EXCESS CALORIES WITHOUT SERIOUS COMORBIDITY WITH BODY MASS INDEX (BMI) OF 30.0 TO 30.9 IN ADULT: Status: ACTIVE | Noted: 2024-09-30

## 2024-09-30 PROCEDURE — 99385 PREV VISIT NEW AGE 18-39: CPT | Performed by: FAMILY MEDICINE

## 2024-09-30 NOTE — PATIENT INSTRUCTIONS
Mindfulness apps: Headspace, Smiling Mind, [place]!    Mindfulness-Based Stress Reduction  Mindfulness-based stress reduction (MBSR) is a program that helps people learn to practice mindfulness. Mindfulness is the practice of intentionally paying attention to the present moment. It can be learned and practiced through techniques such as education, breathing exercises, meditation, and yoga. MBSR includes several mindfulness techniques in one program.  MBSR works best when you understand the treatment, are willing to try new things, and can commit to spending time practicing what you learn. MBSR training may include learning about:  How your emotions, thoughts, and reactions affect your body.  New ways to respond to things that cause negative thoughts to start (triggers).  How to notice your thoughts and let go of them.  Practicing awareness of everyday things that you normally do without thinking.  The techniques and goals of different types of meditation.  What are the benefits of MBSR?  MBSR can have many benefits, which include helping you to:  Develop self-awareness. This refers to knowing and understanding yourself.  Learn skills and attitudes that help you to participate in your own health care.  Learn new ways to care for yourself.  Be more accepting about how things are, and let things go.  Be less judgmental and approach things with an open mind.  Be patient with yourself and trust yourself more.  MBSR has also been shown to:  Reduce negative emotions, such as depression and anxiety.  Improve memory and focus.  Change how you sense and approach pain.  Boost your body's ability to fight infections.  Help you connect better with other people.  Improve your sense of well-being.  Follow these instructions at home:    Find a local in-person or online MBSR program.  Set aside some time regularly for mindfulness practice.  Find a mindfulness practice that works best for you. This may include one or more of the  following:  Meditation. Meditation involves focusing your mind on a certain thought or activity.  Breathing awareness exercises. These help you to stay present by focusing on your breath.  Body scan. For this practice, you lie down and pay attention to each part of your body from head to toe. You can identify tension and soreness and intentionally relax parts of your body.  Yoga. Yoga involves stretching and breathing, and it can improve your ability to move and be flexible. It can also provide an experience of testing your body's limits, which can help you release stress.  Mindful eating. This way of eating involves focusing on the taste, texture, color, and smell of each bite of food. Because this slows down eating and helps you feel full sooner, it can be an important part of a weight-loss plan.  Find a podcast or recording that provides guidance for breathing awareness, body scan, or meditation exercises. You can listen to these any time when you have a free moment to rest without distractions.  Follow your treatment plan as told by your health care provider. This may include taking regular medicines and making changes to your diet or lifestyle as recommended.  How to practice mindfulness  To do a basic awareness exercise:  Find a comfortable place to sit.  Pay attention to the present moment. Observe your thoughts, feelings, and surroundings just as they are.  Avoid placing judgment on yourself, your feelings, or your surroundings. Make note of any judgment that comes up, and let it go.  Your mind may wander, and that is okay. Make note of when your thoughts drift, and return your attention to the present moment.  To do basic mindfulness meditation:  Find a comfortable place to sit. This may include a stable chair or a firm floor cushion.  Sit upright with your back straight. Let your arms fall next to your side with your hands resting on your legs.  If sitting in a chair, rest your feet flat on the floor.  If  sitting on a cushion, cross your legs in front of you.  Keep your head in a neutral position with your chin dropped slightly. Relax your jaw and rest the tip of your tongue on the roof of your mouth. Drop your gaze to the floor. You can close your eyes if you like.  Breathe normally and pay attention to your breath. Feel the air moving in and out of your nose. Feel your belly expanding and relaxing with each breath.  Your mind may wander, and that is okay. Make note of when your thoughts drift, and return your attention to your breath.  Avoid placing judgment on yourself, your feelings, or your surroundings. Make note of any judgment or feelings that come up, let them go, and bring your attention back to your breath.  When you are ready, lift your gaze or open your eyes. Pay attention to how your body feels after the meditation.  Where to find more information  You can find more information about MBSR from:  Your health care provider.  Community-based meditation centers or programs.  Programs offered near you.  Summary  Mindfulness-based stress reduction (MBSR) is a program that teaches you how to intentionally pay attention to the present moment. It is used with other treatments to help you cope better with daily stress, emotions, and pain.  MBSR focuses on developing self-awareness, which allows you to respond to life stress without judgment or negative emotions.  MBSR programs may involve learning different mindfulness practices, such as breathing exercises, meditation, yoga, body scan, or mindful eating. Find a mindfulness practice that works best for you, and set aside time for it on a regular basis.  This information is not intended to replace advice given to you by your health care provider. Make sure you discuss any questions you have with your health care provider.  Document Released: 04/26/2018 Document Revised: 11/30/2018 Document Reviewed: 04/26/2018  Elsevier Patient Education © 2020 [employer]

## 2024-09-30 NOTE — PROGRESS NOTES
"Chief Complaint  Annual Exam    Subjective    History of Present Illness    Sanjiv Damon presents to [facility] for Annual Exam.  History of Present Illness     Here today for annual exam and to discuss preventive health maintenance.    Doing well overall, no real questions or concerns at this time. Considers himself quite healthy. Would like to get some routine blood work.    Recently welcomed his second child, has not had much time for exercise of late. Weight is generally stable. No dietary changes.    Has good family and social support. 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 Stress is well-managed.    Objective     Vital Signs:   /82   Pulse 56   Resp 16   Ht 177.8 cm (70\")   Wt 96.6 kg (213 lb)   SpO2 100%   BMI 30.56 kg/m²   Physical Exam  Vitals and nursing note reviewed.   Constitutional:       General: He is not in acute distress.     Appearance: Normal appearance. He is not ill-appearing.   Cardiovascular:      Rate and Rhythm: Normal rate and regular rhythm.      Pulses: Normal pulses.      Heart sounds: Normal heart sounds. No murmur heard.  Pulmonary:      Effort: Pulmonary effort is normal. No respiratory distress.      Breath sounds: Normal breath sounds. No rales.   Neurological:      Mental Status: He is alert and oriented to person, place, and time. Mental status is at baseline.   Psychiatric:         Mood and Affect: Mood normal.         Behavior: Behavior normal.                 Assessment and Plan   Diagnoses and all orders for this visit:    1. Routine health maintenance (Primary)    2. Screening for diabetes mellitus  -     Comprehensive Metabolic Panel    3. Encounter for lipid screening for cardiovascular disease  -     Lipid Panel    4. Encounter for hepatitis C screening test for low risk patient  -     Hepatitis C Antibody    Orders as above. I will contact him with results as available. Recommended flu and COVID vaccines in the next " week or 2.    BMI is >= 30 and <35. (Class 1 Obesity). The following options were offered after discussion;: exercise counseling/recommendations and nutrition counseling/recommendations    Encouraged to continue working on healthy lifestyle habits. Recommended follow up as below. Encouraged communication via MyChart in the meantime.     Patient was given instructions and counseling regarding his condition or for health maintenance advice. Please see specific information pulled into the AVS (placed there by myself) if appropriate.    Return in about 1 year (around 9/30/2025), or if symptoms worsen or fail to improve, for Preventive Health Maintenance.    SEAN Lee MD    Prevention counseling performed as below: Mindfulness for stress management.

## 2024-10-01 ENCOUNTER — HOSPITAL ENCOUNTER (OUTPATIENT)
Dept: SLEEP MEDICINE | Facility: HOSPITAL | Age: 39
End: 2024-10-01
Payer: COMMERCIAL

## 2024-10-01 DIAGNOSIS — G47.10 HYPERSOMNIA: ICD-10-CM

## 2024-10-01 DIAGNOSIS — R06.83 LOUD SNORING: ICD-10-CM

## 2024-10-01 LAB
ALBUMIN SERPL-MCNC: 4.7 G/DL (ref 3.5–5.2)
ALBUMIN/GLOB SERPL: 2 G/DL
ALP SERPL-CCNC: 80 U/L (ref 39–117)
ALT SERPL-CCNC: 31 U/L (ref 1–41)
AST SERPL-CCNC: 25 U/L (ref 1–40)
BILIRUB SERPL-MCNC: 0.4 MG/DL (ref 0–1.2)
BUN SERPL-MCNC: 15 MG/DL (ref 6–20)
BUN/CREAT SERPL: 15.2 (ref 7–25)
CALCIUM SERPL-MCNC: 9.6 MG/DL (ref 8.6–10.5)
CHLORIDE SERPL-SCNC: 102 MMOL/L (ref 98–107)
CHOLEST SERPL-MCNC: 237 MG/DL (ref 0–200)
CO2 SERPL-SCNC: 25.7 MMOL/L (ref 22–29)
CREAT SERPL-MCNC: 0.99 MG/DL (ref 0.76–1.27)
EGFRCR SERPLBLD CKD-EPI 2021: 100.6 ML/MIN/1.73
GLOBULIN SER CALC-MCNC: 2.4 GM/DL
GLUCOSE SERPL-MCNC: 91 MG/DL (ref 65–99)
HCV IGG SERPL QL IA: NON REACTIVE
HDLC SERPL-MCNC: 49 MG/DL (ref 40–60)
LDLC SERPL CALC-MCNC: 137 MG/DL (ref 0–100)
POTASSIUM SERPL-SCNC: 4.5 MMOL/L (ref 3.5–5.2)
PROT SERPL-MCNC: 7.1 G/DL (ref 6–8.5)
SODIUM SERPL-SCNC: 140 MMOL/L (ref 136–145)
TRIGL SERPL-MCNC: 286 MG/DL (ref 0–150)
VLDLC SERPL CALC-MCNC: 51 MG/DL (ref 5–40)

## 2024-10-01 PROCEDURE — 95806 SLEEP STUDY UNATT&RESP EFFT: CPT

## 2024-10-08 ENCOUNTER — TELEPHONE (OUTPATIENT)
Dept: SLEEP MEDICINE | Facility: HOSPITAL | Age: 39
End: 2024-10-08
Payer: COMMERCIAL

## 2024-10-08 NOTE — TELEPHONE ENCOUNTER
Spoke with patient about sleep study results, pt declined inlab , will look in to snore guard , possible positional therapy or ent for sinus issues

## 2024-10-11 ENCOUNTER — PATIENT ROUNDING (BHMG ONLY) (OUTPATIENT)
Age: 39
End: 2024-10-11
Payer: COMMERCIAL

## 2024-10-11 NOTE — ED NOTES
Thank you for letting us care for you in your recent visit to our [facility] urgent care center. We would love to hear about your experience with us. Was this the first time you have visited our location?    We’re always looking for ways to make our patients’ experiences even better. Do you have any recommendations on ways we may improve?     I appreciate you taking the time to respond. Please be on the lookout for a survey about your recent visit from [facility] via text or email. We would greatly appreciate if you could fill that out and turn it back in. We want your voice to be heard and we value your feedback.   Thank you for choosing [facility] for your healthcare needs.     If you have concerns or would like to speak to me in person regarding your visit please feel free to give me a call. [phone]    Hope you get well soon and thank you.    Jhonatan Chadwick LPN Practice Manager

## 2024-11-04 ENCOUNTER — TELEMEDICINE (OUTPATIENT)
Dept: FAMILY MEDICINE CLINIC | Facility: TELEHEALTH | Age: 39
End: 2024-11-04
Payer: COMMERCIAL

## 2024-11-04 VITALS — BODY MASS INDEX: 30.35 KG/M2 | WEIGHT: 212 LBS | HEIGHT: 70 IN

## 2024-11-04 DIAGNOSIS — J02.0 STREP PHARYNGITIS: Primary | ICD-10-CM

## 2024-11-04 PROCEDURE — 99213 OFFICE O/P EST LOW 20 MIN: CPT | Performed by: NURSE PRACTITIONER

## 2024-11-04 RX ORDER — AMOXICILLIN 500 MG/1
500 CAPSULE ORAL 2 TIMES DAILY
Qty: 20 CAPSULE | Refills: 0 | Status: SHIPPED | OUTPATIENT
Start: 2024-11-04 | End: 2024-11-14

## 2024-11-04 RX ORDER — DIAZEPAM 10 MG/1
10 TABLET ORAL
COMMUNITY
Start: 2024-10-28

## 2024-11-04 NOTE — PROGRESS NOTES
"You have chosen to receive care through a telehealth visit.  Do you consent to use a video/audio connection for your medical care today? Yes     HPI  Sanjiv Damon is a 39 y.o. male  presents with complaint of sore throat.  He reports that his 3 yr old daughter tested positive for strep yesterday and he is beginning to have soreness and crud in his throat.  He had a headache yesterday also.  His symptoms started last night.  Symptoms he is having are noted in the ROS portion of this visit.    Review of Systems   Constitutional:  Negative for fever.   HENT:  Positive for postnasal drip and sore throat.    Gastrointestinal:  Negative for diarrhea and nausea.   Musculoskeletal:  Negative for myalgias.   Neurological:  Positive for headaches.       Past Medical History:   Diagnosis Date    Hypertension        Family History   Problem Relation Age of Onset    No Known Problems Mother     Hyperlipidemia Father     No Known Problems Brother     No Known Problems Other     Prostate cancer Neg Hx     Colon cancer Neg Hx        Social History     Socioeconomic History    Marital status:    Tobacco Use    Smoking status: Never    Smokeless tobacco: Never   Vaping Use    Vaping status: Never Used   Substance and Sexual Activity    Alcohol use: Yes     Alcohol/week: 6.0 standard drinks of alcohol     Types: 3 Cans of beer, 3 Shots of liquor per week     Comment: 10-15 weekly    Drug use: Never    Sexual activity: Yes     Partners: Female     Birth control/protection: Condom     Comment: , two children       Sanjiv Damon  reports that he has never smoked. He has never used smokeless tobacco.      Ht 177.8 cm (70\")   Wt 96.2 kg (212 lb)   BMI 30.42 kg/m²     PHYSICAL EXAM  Physical Exam   Constitutional: He is oriented to person, place, and time. He appears well-developed.   HENT:   Head: Normocephalic and atraumatic.   Nose: Nose normal.   Mouth/Throat: Mucous membranes are erythematous.   Uvula " erythematous, edematous   Eyes: Lids are normal. Right eye exhibits no discharge. Left eye exhibits no discharge. Right conjunctiva is not injected. Left conjunctiva is not injected.   Pulmonary/Chest:  No respiratory distress.  Lymphadenopathy:        Right cervical: Anterior cervical (Patient directed exam) adenopathy present.        Left cervical: Anterior cervical (Patient directed exam) adenopathy present.   Neurological: He is alert and oriented to person, place, and time. No cranial nerve deficit.   Psychiatric: He has a normal mood and affect. His speech is normal and behavior is normal. Judgment and thought content normal.       Results for orders placed or performed in visit on 09/30/24   Comprehensive Metabolic Panel    Collection Time: 09/30/24 11:24 AM    Specimen: Blood   Result Value Ref Range    Glucose 91 65 - 99 mg/dL    BUN 15 6 - 20 mg/dL    Creatinine 0.99 0.76 - 1.27 mg/dL    EGFR Result 100.6 >60.0 mL/min/1.73    BUN/Creatinine Ratio 15.2 7.0 - 25.0    Sodium 140 136 - 145 mmol/L    Potassium 4.5 3.5 - 5.2 mmol/L    Chloride 102 98 - 107 mmol/L    Total CO2 25.7 22.0 - 29.0 mmol/L    Calcium 9.6 8.6 - 10.5 mg/dL    Total Protein 7.1 6.0 - 8.5 g/dL    Albumin 4.7 3.5 - 5.2 g/dL    Globulin 2.4 gm/dL    A/G Ratio 2.0 g/dL    Total Bilirubin 0.4 0.0 - 1.2 mg/dL    Alkaline Phosphatase 80 39 - 117 U/L    AST (SGOT) 25 1 - 40 U/L    ALT (SGPT) 31 1 - 41 U/L   Lipid Panel    Collection Time: 09/30/24 11:24 AM    Specimen: Blood   Result Value Ref Range    Total Cholesterol 237 (H) 0 - 200 mg/dL    Triglycerides 286 (H) 0 - 150 mg/dL    HDL Cholesterol 49 40 - 60 mg/dL    VLDL Cholesterol Marc 51 (H) 5 - 40 mg/dL    LDL Chol Calc (NIH) 137 (H) 0 - 100 mg/dL   Hepatitis C Antibody    Collection Time: 09/30/24 11:24 AM    Specimen: Blood   Result Value Ref Range    Hep C Virus Ab Non Reactive Non Reactive       Diagnoses and all orders for this visit:    1. Strep pharyngitis (Primary)    Other orders  -      amoxicillin (AMOXIL) 500 MG capsule; Take 1 capsule by mouth 2 (Two) Times a Day for 10 days.  Dispense: 20 capsule; Refill: 0    Amoxicillin as directed  Probiotics for two weeks related to taking antibiotics. The pharmacist can help you with this if needed. Do not take within two hours of antibiotic.  Alternate tylenol and ibuprofen for pain or fever  Hydrate well  May gargle with warm salt water for sore throat pain  May try hot tea with lemon and honey for sore throat pain    FOLLOW-UP  If symptoms worsen or persist follow up with PCP, [facility] or Urgent Care    Patient verbalizes understanding of medication dosage, comfort measures, instructions for treatment and follow-up.    Ria Barrientos, APRN  11/04/2024  08:43 EST    The use of a video visit has been reviewed with the patient and verbal informed consent has been obtained. Myself and Sanjiv Damon participated in this visit. The patient is located in [address] [place] [address].    I am located in [place], [place]. [employer] and [employer] Video Client were utilized. I spent 20 minutes in the patient's chart for this visit.